# Patient Record
Sex: MALE
[De-identification: names, ages, dates, MRNs, and addresses within clinical notes are randomized per-mention and may not be internally consistent; named-entity substitution may affect disease eponyms.]

---

## 2023-02-22 PROBLEM — Z00.00 ENCOUNTER FOR PREVENTIVE HEALTH EXAMINATION: Status: ACTIVE | Noted: 2023-02-22

## 2023-02-23 ENCOUNTER — TRANSCRIPTION ENCOUNTER (OUTPATIENT)
Age: 38
End: 2023-02-23

## 2023-02-23 ENCOUNTER — APPOINTMENT (OUTPATIENT)
Dept: UROLOGY | Facility: CLINIC | Age: 38
End: 2023-02-23
Payer: COMMERCIAL

## 2023-02-23 VITALS
SYSTOLIC BLOOD PRESSURE: 109 MMHG | RESPIRATION RATE: 17 BRPM | OXYGEN SATURATION: 96 % | DIASTOLIC BLOOD PRESSURE: 69 MMHG | HEIGHT: 74 IN | HEART RATE: 79 BPM | WEIGHT: 210 LBS | TEMPERATURE: 98.3 F | BODY MASS INDEX: 26.95 KG/M2

## 2023-02-23 DIAGNOSIS — N50.812 LEFT TESTICULAR PAIN: ICD-10-CM

## 2023-02-23 DIAGNOSIS — R10.9 UNSPECIFIED ABDOMINAL PAIN: ICD-10-CM

## 2023-02-23 PROCEDURE — 99204 OFFICE O/P NEW MOD 45 MIN: CPT

## 2023-02-24 NOTE — HISTORY OF PRESENT ILLNESS
[FreeTextEntry1] : Language: English\par Date of First visit: 02/23/2023 \par Accompanied by: self\par Contact info: \par Referring Provider/PCP:  \par Fax: \par \par \par CC/ Problem List:\par left testicular pain\par \par ===============================================================================\par FIRST VISIT / Summary:\par Very pleasant 37 yo M here for left testicular pain starting saturday while moving some things, felt like he was kicked but no trauma. Some nausea, no emesis. no f/c/c/d. Family hx kidney stones (mother multiple) but he has not had before. He also had some L>R abdominal pain / flank pain and low back pain. Lifting makes it worse, ibuprofen minimal help.\par \par -------------------------------------------------------------------------------------------\par INTERVAL VISITS:\par \par ===============================================================================\par \par PMH: Depression\par Meds: wellbutrin\par All: nkda\par FHx: stones, No  malignancies \par SocHx: nonsmoker, etoh 1-2/month\par \par PSH: L ankle\par \par ROS: Review of Systems is as per HPI unless otherwise denoted below\par \par \par ===============================================================================\par DATA: \par \par LABS (SELECTED):---------------------------------------------------------------------------------------------------\par \par \par RADS:-------------------------------------------------------------------------------------------------------------------\par \par \par PATHOLOGY/CYTOLOGY:-------------------------------------------------------------------------------------------\par \par \par VOIDING STUDIES: ----------------------------------------------------------------------------------------------------\par \par \par STONE STUDIES: (Analysis/LLSA)----------------------------------------------------------------------------------\par \par \par PROCEDURES: -----------------------------------------------------------------------------------------------\par \par \par \par \par ===============================================================================\par \par PHYSICAL EXAM:\par \par GEN: AAOx3, NAD\par \par PSYCH: Appropriate Behavior, Affect Congruent\par \par Lungs: No labored breathing\par \par GAIT: Gait normal, Stability good\par \par ABD: no suprapubic or CVAT\par \par \par  FOCUSED: --------------(done 02/23/2023 )------------------------------------------------------------------------\par \par Penis: No lesions, tenderness, curvature, plaques. Circumcised. few tiny normal areas of lighter skin pigmentation on glans, patient also has to take some time to locate it.\par \par Meatus: normal caliber\par \par Testes: Descended bilaterally. Non tender, no palpable masses\par \par Epididymi: No palpable epididymal masses\par \par Scrotum: Scrotal wall normal. No spermatic cord mass. No palpable hydroceles \par \par =======================================================================================\par DISCUSSION: \par =======================================================================================\par ASSESSMENT and PLAN\par \par \par 1. Left testicular pain\par - US and urine cx\par - try tylenol\par - glans skin normal pigmentation changes / variation\par \par 2. Left flank pain\par - US renal given family hx of stones\par \par \par \par =======================================================================================\par Greater than 50% of this 45 minute visit was spent in direct face-to-face counseling with the patient or coordination with other care providers \par Thank you for allowing me to assist in the care of your patient. Should you have any questions please do not hesitate to reach out to me.\par \par \par Tae Ag MD\par [facility]\par [facility] [place]\par [address], [address]\[place] [address]\par T: [phone]\par F: [phone]

## 2023-02-25 LAB
BACTERIA UR CULT: NORMAL
C TRACH RRNA SPEC QL NAA+PROBE: NOT DETECTED
N GONORRHOEA RRNA SPEC QL NAA+PROBE: NOT DETECTED
SOURCE AMPLIFICATION: NORMAL

## 2023-03-02 ENCOUNTER — NON-APPOINTMENT (OUTPATIENT)
Age: 38
End: 2023-03-02

## 2023-07-25 ENCOUNTER — APPOINTMENT (OUTPATIENT)
Dept: UROLOGY | Facility: CLINIC | Age: 38
End: 2023-07-25
Payer: COMMERCIAL

## 2023-07-25 VITALS
BODY MASS INDEX: 26.95 KG/M2 | TEMPERATURE: 98.3 F | HEIGHT: 74 IN | SYSTOLIC BLOOD PRESSURE: 107 MMHG | HEART RATE: 72 BPM | OXYGEN SATURATION: 94 % | DIASTOLIC BLOOD PRESSURE: 72 MMHG | WEIGHT: 210 LBS | RESPIRATION RATE: 17 BRPM

## 2023-07-25 PROCEDURE — 99214 OFFICE O/P EST MOD 30 MIN: CPT

## 2023-07-25 RX ORDER — ONDANSETRON 4 MG/1
4 TABLET ORAL EVERY 6 HOURS
Qty: 3 | Refills: 0 | Status: ACTIVE | COMMUNITY
Start: 2023-07-25 | End: 1900-01-01

## 2023-07-25 NOTE — HISTORY OF PRESENT ILLNESS
[FreeTextEntry1] : Language: English\par Date of First visit: 02/23/2023 \par Accompanied by: self\par Contact info: \par Referring Provider/PCP: Dr. Sudhakar Mena\par Fax: [phone]\par \par \par CC/ Problem List:\par left testicular pain\par unwanted fertility\par \par ===============================================================================\par FIRST VISIT / Summary:\par Very pleasant 37 yo M here for left testicular pain starting saturday while moving some things, felt like he was kicked but no trauma. Some nausea, no emesis. no f/c/c/d. Family hx kidney stones (mother multiple) but he has not had before. He also had some L>R abdominal pain / flank pain and low back pain. Lifting makes it worse, ibuprofen minimal help.\par \par -------------------------------------------------------------------------------------------\par INTERVAL VISITS:\par The patient's medications and allergies were reviewed and edited below. Dated 07/25/2023 \par \par Here to discuss vasectomy. Prior pain has resolved. He and his wife have no children but have decided they do not want any and she is coming off birth control.\par ===============================================================================\par \par PMH: Depression\par Meds: wellbutrin\par All: nkda\par FHx: stones, No  malignancies \par SocHx: nonsmoker, etoh 1-2/month\par \par PSH: L ankle\par \par ROS: Review of Systems is as per HPI unless otherwise denoted below\par \par \par ===============================================================================\par DATA: \par \par LABS (SELECTED):---------------------------------------------------------------------------------------------------\par \par \par RADS:-------------------------------------------------------------------------------------------------------------------\par 3/2/23: Renal US: no stones or hydronephrosis. PVR 34\par \par PATHOLOGY/CYTOLOGY:-------------------------------------------------------------------------------------------\par \par \par VOIDING STUDIES: ----------------------------------------------------------------------------------------------------\par \par \par STONE STUDIES: (Analysis/LLSA)----------------------------------------------------------------------------------\par \par \par PROCEDURES: -----------------------------------------------------------------------------------------------\par \par \par \par \par ===============================================================================\par \par PHYSICAL EXAM:\par \par GEN: AAOx3, NAD\par \par PSYCH: Appropriate Behavior, Affect Congruent\par \par Lungs: No labored breathing\par \par GAIT: Gait normal, Stability good\par \par ABD: no suprapubic or CVAT\par \par \par  FOCUSED: --------------(done 02/23/2023 )------------------------------------------------------------------------\par \par Penis: No lesions, tenderness, curvature, plaques. Circumcised. few tiny normal areas of lighter skin pigmentation on glans, patient also has to take some time to locate it.\par \par Meatus: normal caliber\par \par Testes: Descended bilaterally. Non tender, no palpable masses\par \par Epididymi: No palpable epididymal masses\par \par Scrotum: Scrotal wall normal. No spermatic cord mass. No palpable hydroceles \par \par =======================================================================================\par DISCUSSION: \par The patient and I thoroughly discussed the procedure vasectomy and the risks, benefits and alternatives. He understands that condoms, OCP and female sterilization are other options, and that vasectomy offers no protection from sexually transmitted infections. He understands that despite the fact that a reversal procedure exists, this procedure should be considered IRREVERSIBLE. He understands that there are other risks including bleeding and infection. There is also a risk of testicular injury or missing the vas deferens on one or both sides which would require a repeat procedure.\par \par We reviewed the state mandated consent which he signed. He understands that this consent requires a mandatory 30 day waiting period but expires in 180 days (6mos) from the date of signature. The consent portion of today's visit was performed by a third party and will be completed by the surgeon on the day of the procedure.\par \par He also understands that immediately after this procedure, he may not take a bath or swim for 7 days though he can shower, and he can not have unprotected sex until cleared by his urologist. We discussed the reasons behind this and the patient understands.\par \par The patient was given an opportunity to ask any and all questions related to this procedure and he felt that he had no additional questions. He will be scheduled for vasectomy at his convenience in the next  days.\par \par \par =======================================================================================\par ASSESSMENT and PLAN\par \par \par 1. Unwanted Fertility\par - schedule in  days\par - signed consent\par \par \par \par =======================================================================================\par \par Thank you for allowing me to assist in the care of your patient. Should you have any questions please do not hesitate to reach out to me.\par \par \par Tae Ag MD\par [facility]\par [facility] [place]\par [address], [address]\[place] [address]\par T: [phone]\par F: [phone]

## 2023-07-27 LAB
ANION GAP SERPL CALC-SCNC: 9 MMOL/L
BUN SERPL-MCNC: 21 MG/DL
CALCIUM SERPL-MCNC: 9.4 MG/DL
CHLORIDE SERPL-SCNC: 105 MMOL/L
CO2 SERPL-SCNC: 26 MMOL/L
CREAT SERPL-MCNC: 1.16 MG/DL
EGFR: 84 ML/MIN/1.73M2
GLUCOSE SERPL-MCNC: 99 MG/DL
INR PPP: 0.95 RATIO
POTASSIUM SERPL-SCNC: 4.5 MMOL/L
PT BLD: 10.8 SEC
SODIUM SERPL-SCNC: 140 MMOL/L

## 2023-09-05 ENCOUNTER — APPOINTMENT (OUTPATIENT)
Dept: UROLOGY | Facility: CLINIC | Age: 38
End: 2023-09-05
Payer: COMMERCIAL

## 2023-09-05 VITALS
BODY MASS INDEX: 26.95 KG/M2 | HEART RATE: 76 BPM | DIASTOLIC BLOOD PRESSURE: 53 MMHG | SYSTOLIC BLOOD PRESSURE: 118 MMHG | TEMPERATURE: 98.4 F | HEIGHT: 74 IN | OXYGEN SATURATION: 95 % | RESPIRATION RATE: 16 BRPM | WEIGHT: 210 LBS

## 2023-09-05 PROCEDURE — 55250 REMOVAL OF SPERM DUCT(S): CPT

## 2023-09-08 LAB — CORE LAB BIOPSY: NORMAL

## 2023-10-10 ENCOUNTER — APPOINTMENT (OUTPATIENT)
Dept: UROLOGY | Facility: CLINIC | Age: 38
End: 2023-10-10
Payer: COMMERCIAL

## 2023-10-10 VITALS
TEMPERATURE: 98.1 F | OXYGEN SATURATION: 96 % | DIASTOLIC BLOOD PRESSURE: 76 MMHG | HEART RATE: 65 BPM | HEIGHT: 74 IN | RESPIRATION RATE: 16 BRPM | BODY MASS INDEX: 26.95 KG/M2 | WEIGHT: 210 LBS | SYSTOLIC BLOOD PRESSURE: 113 MMHG

## 2023-10-10 DIAGNOSIS — Z30.09 ENCOUNTER FOR OTHER GENERAL COUNSELING AND ADVICE ON CONTRACEPTION: ICD-10-CM

## 2023-10-10 PROCEDURE — 99024 POSTOP FOLLOW-UP VISIT: CPT

## 2024-01-24 ENCOUNTER — NON-APPOINTMENT (OUTPATIENT)
Age: 39
End: 2024-01-24

## 2024-10-15 ENCOUNTER — NON-APPOINTMENT (OUTPATIENT)
Age: 39
End: 2024-10-15